# Patient Record
Sex: FEMALE | Race: WHITE | URBAN - METROPOLITAN AREA
[De-identification: names, ages, dates, MRNs, and addresses within clinical notes are randomized per-mention and may not be internally consistent; named-entity substitution may affect disease eponyms.]

---
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
## 2018-04-12 ENCOUNTER — TELEPHONE (OUTPATIENT)
Dept: FAMILY MEDICINE | Facility: CLINIC | Age: 18
End: 2018-04-12

## 2018-04-12 NOTE — LETTER
[facility]  [id number] [place] [id number]  [phone]  April 12, 2018    Daria Murcia  [address] [place] [address]    Dear Daria,    I care about your health and have reviewed your health plan. I have reviewed your medical conditions, medication list, and lab results and am making recommendations based on this review, to better manage your health.    You are in particular need of attention regarding:  -Depression    I am recommending that you:  {recommendations:     Here is a list of Health Maintenance topics that are due now or due soon:  Health Maintenance Due   Topic Date Due     PEDS DTAP/TDAP (2 - Td) 07/27/2010     PHQ-9 Q6 MONTHS  04/02/2018       Please call us at [phone] (or use [phone]) to address the above recommendations.     Thank you for trusting [facility] and we appreciate the opportunity to serve you.  We look forward to supporting your healthcare needs in the future.    Healthy Regards,    Justa Adair MD/lf

## 2018-04-12 NOTE — TELEPHONE ENCOUNTER
Panel Management Review      Patient has the following on her problem list:     Depression / Dysthymia review    Measure:  Needs PHQ-9 score of 4 or less during index window.  Administer PHQ-9 and if score is 5 or more, send encounter to provider for next steps.    5 - 7 month window range: 03/02/2018 through 05/02/2018    PHQ-9 SCORE 10/2/2017 10/2/2017   Total Score MyChart - 20 (Severe depression)   Total Score 20 20       If PHQ-9 recheck is 5 or more, route to provider for next steps.    Patient is due for:  PHQ9      Composite cancer screening  Chart review shows that this patient is due/due soon for the following None  Summary:    Patient is due/failing the following:   PHQ9    Action needed:   Patient needs to do PHQ9.    Type of outreach:    Phone, left message for patient to call back.  and Sent letter.    Questions for provider review:    None                                                                                                                                    Neda Slade/SHAZIA  [place]---[facility]       Chart routed to none .

## 2018-05-25 ENCOUNTER — TELEPHONE (OUTPATIENT)
Dept: FAMILY MEDICINE | Facility: CLINIC | Age: 18
End: 2018-05-25

## 2018-05-25 ASSESSMENT — ANXIETY QUESTIONNAIRES
GAD7 TOTAL SCORE: 12
3. WORRYING TOO MUCH ABOUT DIFFERENT THINGS: MORE THAN HALF THE DAYS
1. FEELING NERVOUS, ANXIOUS, OR ON EDGE: MORE THAN HALF THE DAYS
5. BEING SO RESTLESS THAT IT IS HARD TO SIT STILL: SEVERAL DAYS
6. BECOMING EASILY ANNOYED OR IRRITABLE: MORE THAN HALF THE DAYS
IF YOU CHECKED OFF ANY PROBLEMS ON THIS QUESTIONNAIRE, HOW DIFFICULT HAVE THESE PROBLEMS MADE IT FOR YOU TO DO YOUR WORK, TAKE CARE OF THINGS AT HOME, OR GET ALONG WITH OTHER PEOPLE: SOMEWHAT DIFFICULT
7. FEELING AFRAID AS IF SOMETHING AWFUL MIGHT HAPPEN: SEVERAL DAYS
2. NOT BEING ABLE TO STOP OR CONTROL WORRYING: MORE THAN HALF THE DAYS

## 2018-05-25 ASSESSMENT — PATIENT HEALTH QUESTIONNAIRE - PHQ9: 5. POOR APPETITE OR OVEREATING: MORE THAN HALF THE DAYS

## 2018-05-25 NOTE — TELEPHONE ENCOUNTER
Panel Management Review      Patient has the following on her problem list:     Depression / Dysthymia review    Measure:  Needs PHQ-9 score of 4 or less during index window.  Administer PHQ-9 and if score is 5 or more, send encounter to provider for next steps.    5   7 month window range: 02/02/18-06/02/2018    PHQ-9 SCORE 10/2/2017 10/2/2017   Total Score MyChart - 20 (Severe depression)   Total Score 20 20       If PHQ-9 recheck is 5 or more, route to provider for next steps.    Patient is due for:  PHQ9      Composite cancer screening  Chart review shows that this patient is due/due soon for the following None  Summary:    Patient is due/failing the following:   PHQ9    Action needed:   Patient needs to do PHQ9.    Type of outreach:    Phone, left message for patient to call back.     Questions for provider review:    None                                                                                                                                    Neda Slade/SHAZIA  [place]---[facility]       Chart routed to Care Team .

## 2018-05-25 NOTE — TELEPHONE ENCOUNTER
Pt returned call and went through PHQ-9 and ALBA    PHQ-9 SCORE 10/2/2017 10/2/2017 5/25/2018   Total Score MyChart - 20 (Severe depression) -   Total Score 20 20 5     ALBA-7 SCORE 10/2/2017 5/25/2018   Total Score 18 12       Pt states has been much better with her symptoms, now that the sun is out.  She is needing a refill on her Buspar, is currently living in [place] and going to school.  Would be able to do a phone visit if needs to f/u in regards to a refill, she states is not needing the refill on her vilazodone (VIIBRYD) 10 MG TABS tablet at this time, isnt currently taking because depression symptoms seem to subside in the summer month.    Neda Slade/[facility]---[facility]

## 2018-05-26 ASSESSMENT — PATIENT HEALTH QUESTIONNAIRE - PHQ9: SUM OF ALL RESPONSES TO PHQ QUESTIONS 1-9: 5

## 2018-05-26 ASSESSMENT — ANXIETY QUESTIONNAIRES: GAD7 TOTAL SCORE: 12
